# Patient Record
Sex: MALE | Race: WHITE | HISPANIC OR LATINO | URBAN - METROPOLITAN AREA
[De-identification: names, ages, dates, MRNs, and addresses within clinical notes are randomized per-mention and may not be internally consistent; named-entity substitution may affect disease eponyms.]

---

## 2020-04-20 ENCOUNTER — INPATIENT (INPATIENT)
Facility: HOSPITAL | Age: 55
LOS: 2 days | Discharge: HOME | DRG: 388 | End: 2020-04-23
Attending: SPECIALIST | Admitting: SPECIALIST
Payer: MEDICAID

## 2020-04-20 VITALS
SYSTOLIC BLOOD PRESSURE: 156 MMHG | TEMPERATURE: 98 F | WEIGHT: 126.99 LBS | DIASTOLIC BLOOD PRESSURE: 88 MMHG | HEART RATE: 92 BPM | RESPIRATION RATE: 20 BRPM | OXYGEN SATURATION: 95 %

## 2020-04-20 LAB
ALBUMIN SERPL ELPH-MCNC: 3.1 G/DL — LOW (ref 3.5–5)
ALP SERPL-CCNC: 125 U/L — HIGH (ref 40–120)
ALT FLD-CCNC: 178 U/L DA — HIGH (ref 10–60)
ANION GAP SERPL CALC-SCNC: 4 MMOL/L — LOW (ref 5–17)
APPEARANCE UR: CLEAR — SIGNIFICANT CHANGE UP
AST SERPL-CCNC: 102 U/L — HIGH (ref 10–40)
BASOPHILS # BLD AUTO: 0.05 K/UL — SIGNIFICANT CHANGE UP (ref 0–0.2)
BASOPHILS NFR BLD AUTO: 0.6 % — SIGNIFICANT CHANGE UP (ref 0–2)
BILIRUB SERPL-MCNC: 0.9 MG/DL — SIGNIFICANT CHANGE UP (ref 0.2–1.2)
BILIRUB UR-MCNC: NEGATIVE — SIGNIFICANT CHANGE UP
BUN SERPL-MCNC: 8 MG/DL — SIGNIFICANT CHANGE UP (ref 7–18)
CALCIUM SERPL-MCNC: 8.7 MG/DL — SIGNIFICANT CHANGE UP (ref 8.4–10.5)
CHLORIDE SERPL-SCNC: 105 MMOL/L — SIGNIFICANT CHANGE UP (ref 96–108)
CO2 SERPL-SCNC: 30 MMOL/L — SIGNIFICANT CHANGE UP (ref 22–31)
COLOR SPEC: YELLOW — SIGNIFICANT CHANGE UP
CREAT SERPL-MCNC: 0.84 MG/DL — SIGNIFICANT CHANGE UP (ref 0.5–1.3)
DIFF PNL FLD: NEGATIVE — SIGNIFICANT CHANGE UP
EOSINOPHIL # BLD AUTO: 0.1 K/UL — SIGNIFICANT CHANGE UP (ref 0–0.5)
EOSINOPHIL NFR BLD AUTO: 1.2 % — SIGNIFICANT CHANGE UP (ref 0–6)
GLUCOSE SERPL-MCNC: 105 MG/DL — HIGH (ref 70–99)
GLUCOSE UR QL: NEGATIVE — SIGNIFICANT CHANGE UP
HCT VFR BLD CALC: 40.6 % — SIGNIFICANT CHANGE UP (ref 39–50)
HGB BLD-MCNC: 13.7 G/DL — SIGNIFICANT CHANGE UP (ref 13–17)
IMM GRANULOCYTES NFR BLD AUTO: 0.5 % — SIGNIFICANT CHANGE UP (ref 0–1.5)
KETONES UR-MCNC: NEGATIVE — SIGNIFICANT CHANGE UP
LEUKOCYTE ESTERASE UR-ACNC: NEGATIVE — SIGNIFICANT CHANGE UP
LIDOCAIN IGE QN: 90 U/L — SIGNIFICANT CHANGE UP (ref 73–393)
LYMPHOCYTES # BLD AUTO: 2.04 K/UL — SIGNIFICANT CHANGE UP (ref 1–3.3)
LYMPHOCYTES # BLD AUTO: 25.3 % — SIGNIFICANT CHANGE UP (ref 13–44)
MCHC RBC-ENTMCNC: 32.1 PG — SIGNIFICANT CHANGE UP (ref 27–34)
MCHC RBC-ENTMCNC: 33.7 GM/DL — SIGNIFICANT CHANGE UP (ref 32–36)
MCV RBC AUTO: 95.1 FL — SIGNIFICANT CHANGE UP (ref 80–100)
MONOCYTES # BLD AUTO: 0.73 K/UL — SIGNIFICANT CHANGE UP (ref 0–0.9)
MONOCYTES NFR BLD AUTO: 9.1 % — SIGNIFICANT CHANGE UP (ref 2–14)
NEUTROPHILS # BLD AUTO: 5.1 K/UL — SIGNIFICANT CHANGE UP (ref 1.8–7.4)
NEUTROPHILS NFR BLD AUTO: 63.3 % — SIGNIFICANT CHANGE UP (ref 43–77)
NITRITE UR-MCNC: NEGATIVE — SIGNIFICANT CHANGE UP
NRBC # BLD: 0 /100 WBCS — SIGNIFICANT CHANGE UP (ref 0–0)
PH UR: 7 — SIGNIFICANT CHANGE UP (ref 5–8)
PLATELET # BLD AUTO: 202 K/UL — SIGNIFICANT CHANGE UP (ref 150–400)
POTASSIUM SERPL-MCNC: 3.7 MMOL/L — SIGNIFICANT CHANGE UP (ref 3.5–5.3)
POTASSIUM SERPL-SCNC: 3.7 MMOL/L — SIGNIFICANT CHANGE UP (ref 3.5–5.3)
PROT SERPL-MCNC: 7.8 G/DL — SIGNIFICANT CHANGE UP (ref 6–8.3)
PROT UR-MCNC: NEGATIVE — SIGNIFICANT CHANGE UP
RBC # BLD: 4.27 M/UL — SIGNIFICANT CHANGE UP (ref 4.2–5.8)
RBC # FLD: 13.7 % — SIGNIFICANT CHANGE UP (ref 10.3–14.5)
SARS-COV-2 RNA SPEC QL NAA+PROBE: DETECTED
SODIUM SERPL-SCNC: 139 MMOL/L — SIGNIFICANT CHANGE UP (ref 135–145)
SP GR SPEC: 1.01 — SIGNIFICANT CHANGE UP (ref 1.01–1.02)
UROBILINOGEN FLD QL: NEGATIVE — SIGNIFICANT CHANGE UP
WBC # BLD: 8.06 K/UL — SIGNIFICANT CHANGE UP (ref 3.8–10.5)
WBC # FLD AUTO: 8.06 K/UL — SIGNIFICANT CHANGE UP (ref 3.8–10.5)

## 2020-04-20 RX ORDER — IOHEXOL 300 MG/ML
30 INJECTION, SOLUTION INTRAVENOUS ONCE
Refills: 0 | Status: COMPLETED | OUTPATIENT
Start: 2020-04-20 | End: 2020-04-20

## 2020-04-20 RX ORDER — SODIUM CHLORIDE 9 MG/ML
1000 INJECTION INTRAMUSCULAR; INTRAVENOUS; SUBCUTANEOUS ONCE
Refills: 0 | Status: COMPLETED | OUTPATIENT
Start: 2020-04-20 | End: 2020-04-20

## 2020-04-20 RX ORDER — MORPHINE SULFATE 50 MG/1
4 CAPSULE, EXTENDED RELEASE ORAL ONCE
Refills: 0 | Status: DISCONTINUED | OUTPATIENT
Start: 2020-04-20 | End: 2020-04-20

## 2020-04-20 RX ADMIN — MORPHINE SULFATE 4 MILLIGRAM(S): 50 CAPSULE, EXTENDED RELEASE ORAL at 22:24

## 2020-04-20 RX ADMIN — SODIUM CHLORIDE 1000 MILLILITER(S): 9 INJECTION INTRAMUSCULAR; INTRAVENOUS; SUBCUTANEOUS at 22:25

## 2020-04-20 RX ADMIN — IOHEXOL 30 MILLILITER(S): 300 INJECTION, SOLUTION INTRAVENOUS at 22:25

## 2020-04-20 RX ADMIN — SODIUM CHLORIDE 1000 MILLILITER(S): 9 INJECTION INTRAMUSCULAR; INTRAVENOUS; SUBCUTANEOUS at 23:25

## 2020-04-20 NOTE — ED PROVIDER NOTE - CLINICAL SUMMARY MEDICAL DECISION MAKING FREE TEXT BOX
55 yo male presents with abdominal pain. Will obtain labs and urinalysis. Will await for CT results however if unable to obtain, will get CT abdomen.

## 2020-04-20 NOTE — ED PROVIDER NOTE - OBJECTIVE STATEMENT
55 yo male with no PMHx, presents with 3 days of severe abdominal pain. Patient reports having an out-patient CT abdomen with IV contrast today which reportedly showed a tumor. Patient states he was called by a doctor and told he had a tumor - It is unclear whether the tumor is benign or malignant. Patient states he was told to come to ER if he had severe pain so he came to ED tonight. Patient also reports that several years ago he had an episode of hematuria and had a procedure (unsure what procedure), and then his hematuria resolved.     His doctor's phone number is [phone]. Patient had his CT done at [facility] at [address] [place] [address] Rd [phone]. I called the radiology services and left a message to call back. Patient states he was told his official CT report would be available tomorrow.

## 2020-04-20 NOTE — ED PROVIDER NOTE - PROGRESS NOTE DETAILS
endorsed to me. large bowel obstruction. surg consulted. will see pt promptly. -Gurvinder Peoples MD-

## 2020-04-20 NOTE — ED ADULT NURSE NOTE - OBJECTIVE STATEMENT
The patient presents with lower abd pain for three day with tenderness.  He denies n/v/d, dysuria.  He was seen by his PCP and was given unspecified medication with relief; had CAT Scan of abdomen done today.

## 2020-04-21 DIAGNOSIS — K56.609 UNSPECIFIED INTESTINAL OBSTRUCTION, UNSPECIFIED AS TO PARTIAL VERSUS COMPLETE OBSTRUCTION: ICD-10-CM

## 2020-04-21 LAB
APTT BLD: 28.4 SEC — SIGNIFICANT CHANGE UP (ref 27.5–36.3)
BLD GP AB SCN SERPL QL: SIGNIFICANT CHANGE UP
CEA SERPL-MCNC: 3.7 NG/ML — SIGNIFICANT CHANGE UP (ref 0–3.8)
CRP SERPL-MCNC: 2.02 MG/DL — HIGH (ref 0–0.4)
D DIMER BLD IA.RAPID-MCNC: 592 NG/ML DDU — HIGH
FERRITIN SERPL-MCNC: 451 NG/ML — HIGH (ref 30–400)
HCV AB S/CO SERPL IA: 0.16 S/CO — SIGNIFICANT CHANGE UP (ref 0–0.99)
HCV AB SERPL-IMP: SIGNIFICANT CHANGE UP
INR BLD: 1.09 RATIO — SIGNIFICANT CHANGE UP (ref 0.88–1.16)
NT-PROBNP SERPL-SCNC: 35 PG/ML — SIGNIFICANT CHANGE UP (ref 0–125)
PROCALCITONIN SERPL-MCNC: 0.04 NG/ML — SIGNIFICANT CHANGE UP (ref 0.02–0.1)
PROTHROM AB SERPL-ACNC: 12.4 SEC — SIGNIFICANT CHANGE UP (ref 10–12.9)

## 2020-04-21 PROCEDURE — 99285 EMERGENCY DEPT VISIT HI MDM: CPT

## 2020-04-21 PROCEDURE — 74177 CT ABD & PELVIS W/CONTRAST: CPT | Mod: 26

## 2020-04-21 PROCEDURE — 99223 1ST HOSP IP/OBS HIGH 75: CPT

## 2020-04-21 PROCEDURE — 71045 X-RAY EXAM CHEST 1 VIEW: CPT | Mod: 26

## 2020-04-21 PROCEDURE — 99253 IP/OBS CNSLTJ NEW/EST LOW 45: CPT

## 2020-04-21 PROCEDURE — 93010 ELECTROCARDIOGRAM REPORT: CPT

## 2020-04-21 RX ORDER — HEPARIN SODIUM 5000 [USP'U]/ML
5000 INJECTION INTRAVENOUS; SUBCUTANEOUS EVERY 8 HOURS
Refills: 0 | Status: DISCONTINUED | OUTPATIENT
Start: 2020-04-21 | End: 2020-04-23

## 2020-04-21 RX ORDER — SODIUM CHLORIDE 9 MG/ML
1000 INJECTION INTRAMUSCULAR; INTRAVENOUS; SUBCUTANEOUS
Refills: 0 | Status: DISCONTINUED | OUTPATIENT
Start: 2020-04-21 | End: 2020-04-23

## 2020-04-21 RX ORDER — ONDANSETRON 8 MG/1
4 TABLET, FILM COATED ORAL EVERY 6 HOURS
Refills: 0 | Status: DISCONTINUED | OUTPATIENT
Start: 2020-04-21 | End: 2020-04-23

## 2020-04-21 RX ORDER — DEXTROSE MONOHYDRATE, SODIUM CHLORIDE, AND POTASSIUM CHLORIDE 50; .745; 4.5 G/1000ML; G/1000ML; G/1000ML
1000 INJECTION, SOLUTION INTRAVENOUS
Refills: 0 | Status: DISCONTINUED | OUTPATIENT
Start: 2020-04-21 | End: 2020-04-23

## 2020-04-21 RX ORDER — ACETAMINOPHEN 500 MG
1000 TABLET ORAL ONCE
Refills: 0 | Status: COMPLETED | OUTPATIENT
Start: 2020-04-21 | End: 2020-04-21

## 2020-04-21 RX ORDER — SODIUM CHLORIDE 9 MG/ML
1000 INJECTION, SOLUTION INTRAVENOUS
Refills: 0 | Status: DISCONTINUED | OUTPATIENT
Start: 2020-04-21 | End: 2020-04-23

## 2020-04-21 RX ORDER — MORPHINE SULFATE 50 MG/1
4 CAPSULE, EXTENDED RELEASE ORAL EVERY 6 HOURS
Refills: 0 | Status: DISCONTINUED | OUTPATIENT
Start: 2020-04-21 | End: 2020-04-23

## 2020-04-21 RX ADMIN — MORPHINE SULFATE 4 MILLIGRAM(S): 50 CAPSULE, EXTENDED RELEASE ORAL at 05:33

## 2020-04-21 RX ADMIN — HEPARIN SODIUM 5000 UNIT(S): 5000 INJECTION INTRAVENOUS; SUBCUTANEOUS at 23:23

## 2020-04-21 RX ADMIN — Medication 400 MILLIGRAM(S): at 05:19

## 2020-04-21 RX ADMIN — HEPARIN SODIUM 5000 UNIT(S): 5000 INJECTION INTRAVENOUS; SUBCUTANEOUS at 05:33

## 2020-04-21 RX ADMIN — SODIUM CHLORIDE 999 MILLILITER(S): 9 INJECTION, SOLUTION INTRAVENOUS at 05:31

## 2020-04-21 RX ADMIN — SODIUM CHLORIDE 50 MILLILITER(S): 9 INJECTION INTRAMUSCULAR; INTRAVENOUS; SUBCUTANEOUS at 23:23

## 2020-04-21 RX ADMIN — ONDANSETRON 4 MILLIGRAM(S): 8 TABLET, FILM COATED ORAL at 05:33

## 2020-04-21 RX ADMIN — HEPARIN SODIUM 5000 UNIT(S): 5000 INJECTION INTRAVENOUS; SUBCUTANEOUS at 14:35

## 2020-04-21 NOTE — H&P ADULT - ASSESSMENT
53 yo m with no sign.  PMHx, presents with  abdominal pain x 3 days . Pain is described as periumbilical ,non specific and sharp . Denies nausea, and  vomiting. AVSS, PE-non specific periumbilical tenderness. CT-with LBO concerning for colonic neoplasm .Pt is also COVID +.  Admit to surgery     Admit to Surgery under Dr Chang   NPO, IVF on NPO   Medicine consult -Pt is COVID +  Preop, COVID labs  Baseline CXR and EKG  Pain/Nausea control  DVT PPx

## 2020-04-21 NOTE — H&P ADULT - NSHPLABSRESULTS_GEN_ALL_CORE
13.7   8.06  )-----------( 202      ( 20 Apr 2020 21:19 )             40.6   04-20    139  |  105  |  8   ----------------------------<  105<H>  3.7   |  30  |  0.84    Ca    8.7      20 Apr 2020 21:19    TPro  7.8  /  Alb  3.1<L>  /  TBili  0.9  /  DBili  x   /  AST  102<H>  /  ALT  178<H>  /  AlkPhos  125<H>  04-20      < from: CT Abdomen and Pelvis w/ Oral Cont and w/ IV Cont (04.21.20 @ 01:26) >      IMPRESSION:    Partial large bowel obstruction related to sigmoid mass concerning for colonic malignancy. No distant adenopathy. Indeterminate subcentimeter hypodense lesions in the liver. The colon measures up to 5 cm proximal to the transition point. No free air or focal collection.  Nonspecific groundglass and airspace opacities at the lung bases.    < end of copied text >

## 2020-04-21 NOTE — CONSULT NOTE ADULT - ATTENDING COMMENTS
Patient seen and examined ; case was discussed with the admitting resident    ROS: as in the HPI; all other ROS negative    SH and family history as above    Vital Signs Last 24 Hrs  T(C): 37.2 (2020 05:25), Max: 37.2 (2020 05:25)  T(F): 98.9 (2020 05:25), Max: 98.9 (2020 05:25)  HR: 77 (2020 05:25) (77 - 92)  BP: 120/73 (2020 05:25) (120/73 - 156/88)  BP(mean): --  RR: 18 (2020 05:25) (18 - 20)  SpO2: 95% (2020 05:25) (95% - 96%)    GEN: NAD  HEENT- normocephalic; mouth moist  CVS- S1S2+  LUNGS- clear to auscultation; no wheezing  ABD: Soft , nontender, nondistended, Bowel sounds are present  EXTREMITY: no calf tenderness, no cyanosis, no edema  NEURO: AAOx3; non focal neurologic exam; cranial nerves grossly intact  PSYCH: normal affect and behavior  BACK: no swelling or mass;   VASCULAR: ++ distal peripheral pulses  SKIN: warm and dry.       Labs Reviewed:                         13.7   8.06  )-----------( 202      ( 2020 21:19 )             40.6     04-20    139  |  105  |  8   ----------------------------<  105<H>  3.7   |  30  |  0.84    Ca    8.7      2020 21:19    TPro  7.8  /  Alb  3.1<L>  /  TBili  0.9  /  DBili  x   /  AST  102<H>  /  ALT  178<H>  /  AlkPhos  125<H>  04-20        Urinalysis Basic - ( 2020 21:19 )    Color: Yellow / Appearance: Clear / S.010 / pH: x  Gluc: x / Ketone: Negative  / Bili: Negative / Urobili: Negative   Blood: x / Protein: Negative / Nitrite: Negative   Leuk Esterase: Negative / RBC: x / WBC x   Sq Epi: x / Non Sq Epi: x / Bacteria: x      PT/INR - ( 2020 05:15 )   PT: 12.4 sec;   INR: 1.09 ratio         PTT - ( 2020 05:15 )  PTT:28.4 sec  BNP: Serum Pro-Brain Natriuretic Peptide: 35 pg/mL ( @ 05:15)    MEDICATIONS  (STANDING):  dextrose 5% + sodium chloride 0.45% with potassium chloride 20 mEq/L 1000 milliLiter(s) (100 mL/Hr) IV Continuous <Continuous>  heparin   Injectable 5000 Unit(s) SubCutaneous every 8 hours  lactated ringers. 1000 milliLiter(s) (999 mL/Hr) IV Continuous <Continuous>    MEDICATIONS  (PRN):  morphine  - Injectable 4 milliGRAM(s) IV Push every 6 hours PRN Severe Pain (7 - 10)  ondansetron Injectable 4 milliGRAM(s) IV Push every 6 hours PRN Nausea    CT abd/pelv reviewed   EKG Reviewed      55 y/o M with large bowel obstruction likely due to colon cancer. Patient has had intermittent BRBPR and associated weight loss. Denies any respiratory symptoms, had malaise and cough about a month ago but none now.    1. Large bowel obstruction concerning for colon ca- npo, serial abdominal exams, IVF, management per surgery, surgical oncology. Further imaging for staging per their recommendations. RCRI is 0 and patient with good METS>4, low risk for potential surgery with non-modifiable risk factors with no further testing warranted.   2. COVID19 positive- denies any symptoms, is stable on RA, CXR pending but will defer specific therapies in absence of these findings. F/u CXR, d dimer, ferritin, pct.   3. Elevated hepatic enzymes- indeterminate lesion seen on CT abd, suspected viral sequelae vs possible metastatic focus   Plan of care discussed with patient ;  all questions and concerns were addressed.

## 2020-04-21 NOTE — CONSULT NOTE ADULT - ASSESSMENT
Patient is a 54y old  Male with no prior medical problems who presents with a chief complaint of abdominal pain. Patient came in to ER for abdominal pain x 3 days associated with nausea and vomiting. He is found to have partial large bowel obstruction due to sigmoid colon mass and is covid positive. He is afebrile and is saturating well on room air. Medical team is consulted for managed for covid infection.     Large bowel obstruction   Suspected malignancy   COVID-19 infection  Transaminitis     Plan:     COVID-19 infection  recommend to obtain chest x ray   Monitor Oxygen saturations , currently saturating well on room air  check D-Dimers, Ferritin, CRP   Check EKG for QTC prolongation  Since patient will be NPO, can consider tocilizumab if inflammatory markers are elevated i-e ferritin >700 and crp >30.  Judicious IV fluids as needed to avoid volume overload in COVID infection.   replace electrolytes as needed   Large bowl obstruction managed as per surgery Patient is a 54y old  Male with no prior medical problems who presents with a chief complaint of abdominal pain. Patient came in to ER for abdominal pain x 3 days associated with nausea and vomiting. He is found to have partial large bowel obstruction due to sigmoid colon mass and is covid positive. He is afebrile and is saturating well on room air. Medical team is consulted for managed for covid infection.     Large bowel obstruction   Suspected malignancy   COVID-19 infection  Transaminitis     Plan:     COVID-19 infection  recommend to obtain chest x ray   Monitor Oxygen saturations , currently saturating well on room air  check D-Dimers, Ferritin, CRP   Check EKG NSR with normal QTC  Since patient will be NPO, can consider tocilizumab if inflammatory markers are elevated i-e ferritin >700 and crp >30, currently would hold off to any COVID-19 regime due to patient being asymptomatic.  Judicious IV fluids as needed to avoid volume overload in COVID infection.   replace electrolytes as needed  Will recommend CT head and chest for staging  WIll recommend medical oncology consult   RCRI score 0. Patient is medically optimized.   Large bowl obstruction managed as per surgery

## 2020-04-21 NOTE — CONSULT NOTE ADULT - ASSESSMENT
54 year old male with sigmoid mass.  Covid positive     Sigmoid Mass   Need colonoscopy   Will discuss with administration when possible to perform procedure     COVID as per primary team   Advanced care planning was discussed with patient and family.  Advanced care planning forms were reviewed and discussed.  Risks, benefits and alternatives of gastroenterologic procedures were discussed in detail and all questions were answered.

## 2020-04-21 NOTE — CONSULT NOTE ADULT - SUBJECTIVE AND OBJECTIVE BOX
Patient is a 54y old  Male who presents with a chief complaint of Abdominal Pain-LBO (21 Apr 2020 11:38)    53 yo m with no sign.  PMHx, presents with  abdominal pain x 3 days . Pain is described as periumbilical ,non specific and sharp . Denies nausea, and  vomiting. Also denies fever, chills, SOB, cough, sick contacts , recent travels , chest pain or any other problems . Last BM was yesterday AM .No diarrhea or constipation Patient reports having an out-patient CT abdomen with IV contrast today which reportedly showed a tumor. Patient states he was told to come to ER if he had severe pain so he came to ED tonight. Patient also reports that several years ago he had an episode of hematuria and had a procedure (unsure what procedure), and then his hematuria resolved. His doctor's phone number is [phone]. No other complaints       REVIEW OF SYSTEMS  Constitutional:   No fever, no fatigue, no pallor, no night sweats, no weight loss.  HEENT:   No eye pain, no vision changes, no icterus, no mouth ulcers.  Respiratory:   No shortness of breath, no cough, no respiratory distress.   Cardiovascular:   No chest pain, no palpitations.   Gastrointestinal: No abdominal pain, no nausea, no vomiting , no diahrrea, no constipation, no hematochezia,no melena.  Skin:   No rashes, no jaundice, no eczema.   Musculoskeletal:   No joint pain, no swelling, no myalgia.   Neurologic:   No headache, no seizure, no weakness.   Genitourinary:   No dysuria, no decreased urine output.  Psychiatric:  No depression, no anxiety,   Endocrine:   No thyroid disease, no diabetes.  Heme/Lymphatic:   No anemia, no blood transfusions, no lymph node enlargement, no bleeding, no bruising.  ___________________________________________________________________________________________  Allergies    No Known Allergies    Intolerances      MEDICATIONS  (STANDING):  dextrose 5% + sodium chloride 0.45% with potassium chloride 20 mEq/L 1000 milliLiter(s) (100 mL/Hr) IV Continuous <Continuous>  heparin   Injectable 5000 Unit(s) SubCutaneous every 8 hours  lactated ringers. 1000 milliLiter(s) (999 mL/Hr) IV Continuous <Continuous>    MEDICATIONS  (PRN):  morphine  - Injectable 4 milliGRAM(s) IV Push every 6 hours PRN Severe Pain (7 - 10)  ondansetron Injectable 4 milliGRAM(s) IV Push every 6 hours PRN Nausea      PAST MEDICAL & SURGICAL HISTORY:    FAMILY HISTORY:    Social History: No hsitory of : Tobacco use, IVDA, EToH  ______________________________________________________________________________________    PHYSICAL EXAM    Daily     Daily   BMI: 140.6 (04-21 @ 04:52)  Change in Weight:  Vital Signs Last 24 Hrs  T(C): 36.3 (21 Apr 2020 10:26), Max: 37.2 (21 Apr 2020 05:25)  T(F): 97.3 (21 Apr 2020 10:26), Max: 98.9 (21 Apr 2020 05:25)  HR: 68 (21 Apr 2020 10:26) (62 - 92)  BP: 112/74 (21 Apr 2020 10:26) (112/74 - 156/88)  BP(mean): --  RR: 16 (21 Apr 2020 10:26) (16 - 20)  SpO2: 97% (21 Apr 2020 10:26) (95% - 98%)    General:  Well developed, well nourished, alert and active, no pallor, NAD.  HEENT:    Normal appearance of conjunctiva, ears, nose, lips, oropharynx, and oral mucosa, anicteric.  Neck:  No masses, no asymmetry.  Lymph Nodes:  No lymphadenopathy.   Cardiovascular:  RRR normal S1/S2, no murmur.  Respiratory:  CTA B/L, normal respiratory effort.   Abdominal:   soft, no masses or tenderness, normoactive BS, NT/ND, no HSM.  Extremities:   No clubbing or cyanosis, normal capillary refill, no edema.   Skin:   No rash, jaundice, lesions, eczema.   Musculoskeletal:  No joint swelling, erythema or tenderness.   Neuro: No focal deficits.   Other:   _______________________________________________________________________________________________  Lab Results:                          13.7   8.06  )-----------( 202      ( 20 Apr 2020 21:19 )             40.6     04-20    139  |  105  |  8   ----------------------------<  105<H>  3.7   |  30  |  0.84    Ca    8.7      20 Apr 2020 21:19    TPro  7.8  /  Alb  3.1<L>  /  TBili  0.9  /  DBili  x   /  AST  102<H>  /  ALT  178<H>  /  AlkPhos  125<H>  04-20    LIVER FUNCTIONS - ( 20 Apr 2020 21:19 )  Alb: 3.1 g/dL / Pro: 7.8 g/dL / ALK PHOS: 125 U/L / ALT: 178 U/L DA / AST: 102 U/L / GGT: x           PT/INR - ( 21 Apr 2020 05:15 )   PT: 12.4 sec;   INR: 1.09 ratio         PTT - ( 21 Apr 2020 05:15 )  PTT:28.4 sec  C-Reactive Protein, Serum: 2.02 mg/dL (04-21 @ 09:25)        Stool Results:          RADIOLOGY RESULTS:  < from: CT Abdomen and Pelvis w/ Oral Cont and w/ IV Cont (04.21.20 @ 01:26) >    EXAM:  CT ABDOMEN AND PELVIS OC IC                            PROCEDURE DATE:  04/21/2020          INTERPRETATION:  Abdominal/Pelvic CT    4/21/2020 1:51 AM    Indication: Diffuse abdominal pain, worst in the left lower quadrant    Technique: Axialimages were obtained following oral and IV contrast from the lung bases through pubic symphysis.  95 cc of Omnipaque 350 was administered intravenously without complication and 5 cc was discarded.  Reformatted coronal and sagittal images are submitted.    Comparison: None    FINDINGS:    LUNG BASES:  There are patchy groundglass and airspace opacities at the lung bases which are nonspecific.  PERITONEUM:  There is no free air or focal collection.  No free fluid.  LIVER: 8 mm hypodense lesion in the right lobe is indeterminate.  SPLEEN: Normal.  GALLBLADDER: Vicarious excretion of contrast.  BILIARY TREE: Unremarkable.  PANCREAS: Normal.  ADRENAL GLANDS: Normal.  KIDNEYS: Normal.  BOWEL: The stomach is incompletely distended.  Oral contrast isseen as distally as splenic flexure.  There is no small bowel obstruction. The appendix is unremarkable. Marked dilatation and wall thickening of the sigmoid colon with circumferential mass in the mid sigmoid colon concerning for colonic neoplasm.Thececum measures up to 4.6 cm in caliber. The left colon measures up to 4.8 cm. The sigmoid colon measures up to 5 cm proximal to the transition point.  There are no large pericolonic lymph nodes.    URINARY BLADDER: Incompletely distended.  PELVIC ORGANS: The prostate gland is not enlarged.    There is no significant adenopathy.  VASCULATURE: Unremarkable.  RETROPERITONEUM:  There is no mass.  BONES: Unremarkable.  ABDOMINAL WALL: Unremarkable.    IMPRESSION:    Partial large bowel obstruction related to sigmoid mass concerning for colonic malignancy. No distant adenopathy. Indeterminate subcentimeter hypodense lesions in the liver. The colon measures up to 5 cm proximal to the transition point. No free air or focal collection.  Nonspecific groundglass and airspace opacities at the lung bases.                JUVENCIO IVEY M.D ATTENDING RADIOLOGIST  This document has been electronically signed. Apr 21 2020  2:03AM                < end of copied text >  < from: CT Abdomen and Pelvis w/ Oral Cont and w/ IV Cont (04.21.20 @ 01:26) >    EXAM:  CT ABDOMEN AND PELVIS OC IC                            PROCEDURE DATE:  04/21/2020          INTERPRETATION:  Abdominal/Pelvic CT    4/21/2020 1:51 AM    Indication: Diffuse abdominal pain, worst in the left lower quadrant    Technique: Axialimages were obtained following oral and IV contrast from the lung bases through pubic symphysis.  95 cc of Omnipaque 350 was administered intravenously without complication and 5 cc was discarded.  Reformatted coronal and sagittal images are submitted.    Comparison: None    FINDINGS:    LUNG BASES:  There are patchy groundglass and airspace opacities at the lung bases which are nonspecific.  PERITONEUM:  There is no free air or focal collection.  No free fluid.  LIVER: 8 mm hypodense lesion in the right lobe is indeterminate.  SPLEEN: Normal.  GALLBLADDER: Vicarious excretion of contrast.  BILIARY TREE: Unremarkable.  PANCREAS: Normal.  ADRENAL GLANDS: Normal.  KIDNEYS: Normal.  BOWEL: The stomach is incompletely distended.  Oral contrast isseen as distally as splenic flexure.  There is no small bowel obstruction. The appendix is unremarkable. Marked dilatation and wall thickening of the sigmoid colon with circumferential mass in the mid sigmoid colon concerning for colonic neoplasm.Thececum measures up to 4.6 cm in caliber. The left colon measures up to 4.8 cm. The sigmoid colon measures up to 5 cm proximal to the transition point.  There are no large pericolonic lymph nodes.    URINARY BLADDER: Incompletely distended.  PELVIC ORGANS: The prostate gland is not enlarged.    There is no significant adenopathy.  VASCULATURE: Unremarkable.  RETROPERITONEUM:  There is no mass.  BONES: Unremarkable.  ABDOMINAL WALL: Unremarkable.    IMPRESSION:    Partial large bowel obstruction related to sigmoid mass concerning for colonic malignancy. No distant adenopathy. Indeterminate subcentimeter hypodense lesions in the liver. The colon measures up to 5 cm proximal to the transition point. No free air or focal collection.  Nonspecific groundglass and airspace opacities at the lung bases.                JUVENCIO IVEY M.D, ATTENDING RADIOLOGIST  This document has been electronically signed. Apr 21 2020  2:03AM                < end of copied text >    SURGICAL PATHOLOGY:

## 2020-04-21 NOTE — PROGRESS NOTE ADULT - SUBJECTIVE AND OBJECTIVE BOX
54y Male    Surgery Progress Note:    S:     Patient resting comfortably. No acute complaints. passed flatus        Gen: A&Ox3. NAD  Abd:  Soft, nondistended,     VS:    T(C): 36.3 (04-21-20 @ 10:26), Max: 37.2 (04-21-20 @ 05:25)  HR: 68 (04-21-20 @ 10:26) (62 - 92)  BP: 112/74 (04-21-20 @ 10:26) (112/74 - 156/88)  RR: 16 (04-21-20 @ 10:26) (16 - 20)  SpO2: 97% (04-21-20 @ 10:26) (95% - 98%)  Wt(kg): --    I/O's     Meds: dextrose 5% + sodium chloride 0.45% with potassium chloride 20 mEq/L 1000 milliLiter(s) IV Continuous <Continuous>  heparin   Injectable 5000 Unit(s) SubCutaneous every 8 hours  lactated ringers. 1000 milliLiter(s) IV Continuous <Continuous>  morphine  - Injectable 4 milliGRAM(s) IV Push every 6 hours PRN  ondansetron Injectable 4 milliGRAM(s) IV Push every 6 hours PRN      Labs:                             13.7   8.06  )-----------( 202      ( 20 Apr 2020 21:19 )             40.6     04-20    139  |  105  |  8   ----------------------------<  105<H>  3.7   |  30  |  0.84    Ca    8.7      20 Apr 2020 21:19    TPro  7.8  /  Alb  3.1<L>  /  TBili  0.9  /  DBili  x   /  AST  102<H>  /  ALT  178<H>  /  AlkPhos  125<H>  04-20    LIVER FUNCTIONS - ( 20 Apr 2020 21:19 )  Alb: 3.1 g/dL / Pro: 7.8 g/dL / ALK PHOS: 125 U/L / ALT: 178 U/L DA / AST: 102 U/L / GGT: x

## 2020-04-21 NOTE — H&P ADULT - NSHPPHYSICALEXAM_GEN_ALL_CORE
Vital Signs Last 24 Hrs  T(C): 37.2 (21 Apr 2020 05:25), Max: 37.2 (21 Apr 2020 05:25)  T(F): 98.9 (21 Apr 2020 05:25), Max: 98.9 (21 Apr 2020 05:25)  HR: 77 (21 Apr 2020 05:25) (77 - 92)  BP: 120/73 (21 Apr 2020 05:25) (120/73 - 156/88)  BP(mean): --  RR: 18 (21 Apr 2020 05:25) (18 - 20)  SpO2: 95% (21 Apr 2020 05:25) (95% - 96%)    General:  A&Ox3,Appears stated age, No acute distress,  Head: NC/AT  EENT: PERRLA. EOMI. Conjunctiva and sclera clear. Pharynx clear.  Neck: Supple. No JVD  Lungs: CTA B/l. Nonlabored Respirations  CV: +S1S2, RRR  Abdomen: Soft, Nondistended,  mild tenderness in the periumbilical area, no guarding, no rebound  Extremities: Warm and well perfused. 2+ peripheral pulses b/l. Calf soft, nontender b/l. No pedal edema.

## 2020-04-21 NOTE — CONSULT NOTE ADULT - SUBJECTIVE AND OBJECTIVE BOX
Patient is a 54y old  Male with no prior medical problems who presents with a chief complaint of abdominal pain. Patient came in to ER for abdominal pain x 3 days associated with nausea and vomiting. He is found to have partial large bowel obstruction due to sigmoid colon mass and is covid positive. He is afebrile and is saturating well on room air. Medical team is consulted for managed for covid infection. All other ROS are negative.      REVIEW OF SYSTEMS:  CONSTITUTIONAL: No fever, weight loss, or fatigue  EYES: No eye pain, visual disturbances, or discharge  ENMT:  No difficulty hearing, tinnitus, vertigo; No sinus or throat pain  NECK: No pain or stiffness  BREASTS: No pain, masses, or nipple discharge  RESPIRATORY: No cough, wheezing, chills or hemoptysis; No shortness of breath  CARDIOVASCULAR: No chest pain, palpitations, dizziness, or leg swelling  GASTROINTESTINAL: abdominal pain +  GENITOURINARY: No dysuria, frequency, hematuria, or incontinence  NEUROLOGICAL: No headaches, memory loss, loss of strength, numbness, or tremors  SKIN: No itching, burning, rashes, or lesions   LYMPH NODES: No enlarged glands  ENDOCRINE: No heat or cold intolerance; No hair loss  MUSCULOSKELETAL: No joint pain or swelling; No muscle, back, or extremity pain  PSYCHIATRIC: No depression, anxiety, mood swings, or difficulty sleeping  HEME/LYMPH: No easy bruising, or bleeding gums  ALLERY AND IMMUNOLOGIC: No hives or eczema      INTERVAL HPI/OVERNIGHT EVENTS:  T(C): 36.4 (20 @ 01:38), Max: 36.5 (20 @ 20:19)  HR: 88 (20 @ :38) (88 - 92)  BP: 125/77 (20 @ 01:38) (125/77 - 156/88)  RR: 18 (20 @ :38) (18 - 20)  SpO2: 96% (20 @ 01:38) (95% - 96%)  Wt(kg): --  I&O's Summary      PHYSICAL EXAM:  GENERAL: NAD, well-groomed, well-developed  HEAD:  Atraumatic, Normocephalic  EYES: EOMI, PERRLA, conjunctiva and sclera clear  ENMT: No tonsillar erythema, exudates, or enlargement; Moist mucous membranes, Good dentition, No lesions  NECK: Supple, No JVD, Normal thyroid  NERVOUS SYSTEM:  Alert & Oriented X3, Good concentration; Motor Strength 5/5 B/L upper and lower extremities; DTRs 2+ intact and symmetric  CHEST/LUNG: Clear to percussion bilaterally; No rales, rhonchi, wheezing, or rubs  HEART: Regular rate and rhythm; No murmurs, rubs, or gallops  ABDOMEN: LLQ abdominal tenderness   EXTREMITIES:  2+ Peripheral Pulses, No clubbing, cyanosis, or edema  LYMPH: No lymphadenopathy noted  SKIN: No rashes or lesions        LABS:                        13.7   8.06  )-----------( 202      ( 2020 21:19 )             40.6         139  |  105  |  8   ----------------------------<  105<H>  3.7   |  30  |  0.84    Ca    8.7      2020 21:19    TPro  7.8  /  Alb  3.1<L>  /  TBili  0.9  /  DBili  x   /  AST  102<H>  /  ALT  178<H>  /  AlkPhos  125<H>        Urinalysis Basic - ( 2020 21:19 )    Color: Yellow / Appearance: Clear / S.010 / pH: x  Gluc: x / Ketone: Negative  / Bili: Negative / Urobili: Negative   Blood: x / Protein: Negative / Nitrite: Negative   Leuk Esterase: Negative / RBC: x / WBC x   Sq Epi: x / Non Sq Epi: x / Bacteria: x      CAPILLARY BLOOD GLUCOSE            Urinalysis Basic - ( 2020 21:19 )    Color: Yellow / Appearance: Clear / S.010 / pH: x  Gluc: x / Ketone: Negative  / Bili: Negative / Urobili: Negative   Blood: x / Protein: Negative / Nitrite: Negative   Leuk Esterase: Negative / RBC: x / WBC x   Sq Epi: x / Non Sq Epi: x / Bacteria: x          RADIOLOGY & ADDITIONAL TESTS:  < from: CT Abdomen and Pelvis w/ Oral Cont and w/ IV Cont (20 @ 01:26) >  LUNG BASES:  There are patchy groundglass and airspace opacities at the lung bases which are nonspecific.  PERITONEUM:  There is no free air or focal collection.  No free fluid.  LIVER: 8 mm hypodense lesion in the right lobe is indeterminate.  SPLEEN: Normal.  GALLBLADDER: Vicarious excretion of contrast.  BILIARY TREE: Unremarkable.  PANCREAS: Normal.  ADRENAL GLANDS: Normal.  KIDNEYS: Normal.  BOWEL: The stomach is incompletely distended.  Oral contrast isseen as distally as splenic flexure.  There is no small bowel obstruction. The appendix is unremarkable. Marked dilatation and wall thickening of the sigmoid colon with circumferential mass in the mid sigmoid colon concerning for colonic neoplasm.Thececum measures up to 4.6 cm in caliber. The left colon measures up to 4.8 cm. The sigmoid colon measures up to 5 cm proximal to the transition point.  There are no large pericolonic lymph nodes.    URINARY BLADDER: Incompletely distended.  PELVIC ORGANS: The prostate gland is not enlarged.    There is no significant adenopathy.  VASCULATURE: Unremarkable.  RETROPERITONEUM:  There is no mass.  BONES: Unremarkable.  ABDOMINAL WALL: Unremarkable.          Imaging Personally Reviewed:  [x ] YES  [ ] NO    Consultant(s) Notes Reviewed:  [x ] YES  [ ] NO    Care Discussed with Consultants/Other Providers [ ] YES  [x ] NO Patient is a 54y old  Male with no prior medical problems who presents with a chief complaint of abdominal pain. Patient came in to ER for abdominal pain x 3 days. Patient had an outpatient ct scan which showed a tumor. He did not had any evaluation performed for that. Today his abdominal pain became worse and he came in to ER. He is found to have partial large bowel obstruction due to sigmoid colon mass and is covid positive. He is afebrile and is saturating well on room air. He had mild flu like symptoms a month ago but denied any fevers, chills, nausea, sick contacts. Medical team is consulted for managed for covid infection. All other ROS are negative.      REVIEW OF SYSTEMS:  CONSTITUTIONAL: No fever, weight loss, or fatigue  EYES: No eye pain, visual disturbances, or discharge  ENMT:  No difficulty hearing, tinnitus, vertigo; No sinus or throat pain  NECK: No pain or stiffness  BREASTS: No pain, masses, or nipple discharge  RESPIRATORY: No cough, wheezing, chills or hemoptysis; No shortness of breath  CARDIOVASCULAR: No chest pain, palpitations, dizziness, or leg swelling  GASTROINTESTINAL: abdominal pain +  GENITOURINARY: No dysuria, frequency, hematuria, or incontinence  NEUROLOGICAL: No headaches, memory loss, loss of strength, numbness, or tremors  SKIN: No itching, burning, rashes, or lesions   LYMPH NODES: No enlarged glands  ENDOCRINE: No heat or cold intolerance; No hair loss  MUSCULOSKELETAL: No joint pain or swelling; No muscle, back, or extremity pain  PSYCHIATRIC: No depression, anxiety, mood swings, or difficulty sleeping  HEME/LYMPH: No easy bruising, or bleeding gums  ALLERY AND IMMUNOLOGIC: No hives or eczema      T(C): 36.4 (20 @ 01:38), Max: 36.5 (20 @ 20:19)  HR: 88 (20 @ :38) (88 - 92)  BP: 125/77 (20 @ 01:38) (125/77 - 156/88)  RR: 18 (20 @ :38) (18 - 20)  SpO2: 96% (20 @ 01:38) (95% - 96%)  Wt(kg): --  I&O's Summary      PHYSICAL EXAM:  GENERAL: NAD, well-groomed, well-developed  HEAD:  Atraumatic, Normocephalic  EYES: EOMI, PERRLA, conjunctiva and sclera clear  ENMT: No tonsillar erythema, exudates, or enlargement; Moist mucous membranes, Good dentition, No lesions  NECK: Supple, No JVD, Normal thyroid  NERVOUS SYSTEM:  Alert & Oriented X3, Good concentration; Motor Strength 5/5 B/L upper and lower extremities; DTRs 2+ intact and symmetric  CHEST/LUNG: Clear to percussion bilaterally; No rales, rhonchi, wheezing, or rubs  HEART: Regular rate and rhythm; No murmurs, rubs, or gallops  ABDOMEN: tense abdomen, no rebound tenderness, However tenderness is more pronounced LLQ abdominal   EXTREMITIES:  2+ Peripheral Pulses, No clubbing, cyanosis, or edema  LYMPH: No lymphadenopathy noted  SKIN: No rashes or lesions        LABS:                        13.7   8.06  )-----------(       ( 2020 21:19 )             40.6     -    139  |  105  |  8   ----------------------------<  105<H>  3.7   |  30  |  0.84    Ca    8.7      2020 21:19    TPro  7.8  /  Alb  3.1<L>  /  TBili  0.9  /  DBili  x   /  AST  102<H>  /  ALT  178<H>  /  AlkPhos  125<H>  -20      Urinalysis Basic - ( 2020 21:19 )    Color: Yellow / Appearance: Clear / S.010 / pH: x  Gluc: x / Ketone: Negative  / Bili: Negative / Urobili: Negative   Blood: x / Protein: Negative / Nitrite: Negative   Leuk Esterase: Negative / RBC: x / WBC x   Sq Epi: x / Non Sq Epi: x / Bacteria: x      CAPILLARY BLOOD GLUCOSE            Urinalysis Basic - ( 2020 21:19 )    Color: Yellow / Appearance: Clear / S.010 / pH: x  Gluc: x / Ketone: Negative  / Bili: Negative / Urobili: Negative   Blood: x / Protein: Negative / Nitrite: Negative   Leuk Esterase: Negative / RBC: x / WBC x   Sq Epi: x / Non Sq Epi: x / Bacteria: x          RADIOLOGY & ADDITIONAL TESTS:  < from: CT Abdomen and Pelvis w/ Oral Cont and w/ IV Cont (20 @ 01:26) >  LUNG BASES:  There are patchy groundglass and airspace opacities at the lung bases which are nonspecific.  PERITONEUM:  There is no free air or focal collection.  No free fluid.  LIVER: 8 mm hypodense lesion in the right lobe is indeterminate.  SPLEEN: Normal.  GALLBLADDER: Vicarious excretion of contrast.  BILIARY TREE: Unremarkable.  PANCREAS: Normal.  ADRENAL GLANDS: Normal.  KIDNEYS: Normal.  BOWEL: The stomach is incompletely distended.  Oral contrast isseen as distally as splenic flexure.  There is no small bowel obstruction. The appendix is unremarkable. Marked dilatation and wall thickening of the sigmoid colon with circumferential mass in the mid sigmoid colon concerning for colonic neoplasm.Thececum measures up to 4.6 cm in caliber. The left colon measures up to 4.8 cm. The sigmoid colon measures up to 5 cm proximal to the transition point.  There are no large pericolonic lymph nodes.    URINARY BLADDER: Incompletely distended.  PELVIC ORGANS: The prostate gland is not enlarged.    There is no significant adenopathy.  VASCULATURE: Unremarkable.  RETROPERITONEUM:  There is no mass.  BONES: Unremarkable.  ABDOMINAL WALL: Unremarkable.          Imaging Personally Reviewed:  [x ] YES  [ ] NO    Consultant(s) Notes Reviewed:  [x ] YES  [ ] NO    Care Discussed with Consultants/Other Providers [ ] YES  [x ] NO

## 2020-04-21 NOTE — CONSULT NOTE ADULT - CONSULT REQUESTED BY NAME
Dr Chang
Surgery
This is a 69 yo man with history of HTN with complaints of intermittent chest heaviness, increasing fatigue and shortness of breath. Went for stress test today which was abnormal pt had chest heaviness on treadmill with diffuse ST depression, inferior hypokinesia. cath revealed normal LV function with heavily calcified coronaries with severe diseae of proxix aand mid LAD with aneurysmal dilitation of proximal LAD and severe disease of distal RCA 99% probable culprit.

## 2020-04-21 NOTE — H&P ADULT - HISTORY OF PRESENT ILLNESS
53 yo m with no sign.  PMHx, presents with  abdominal pain x 3 days . Pain is described as periumbilical ,non specific and sharp . Denies nausea, and  vomiting. Also denies fever, chills, SOB, cough, sick contacts , recent travels , chest pain or any other problems . Last BM was yesterday AM .No diarrhea or constipation Patient reports having an out-patient CT abdomen with IV contrast today which reportedly showed a tumor. Patient states he was told to come to ER if he had severe pain so he came to ED tonight. Patient also reports that several years ago he had an episode of hematuria and had a procedure (unsure what procedure), and then his hematuria resolved. His doctor's phone number is [phone]. No other complaints

## 2020-04-22 DIAGNOSIS — K56.609 UNSPECIFIED INTESTINAL OBSTRUCTION, UNSPECIFIED AS TO PARTIAL VERSUS COMPLETE OBSTRUCTION: ICD-10-CM

## 2020-04-22 DIAGNOSIS — Z29.9 ENCOUNTER FOR PROPHYLACTIC MEASURES, UNSPECIFIED: ICD-10-CM

## 2020-04-22 DIAGNOSIS — U07.1 COVID-19: ICD-10-CM

## 2020-04-22 DIAGNOSIS — Z02.9 ENCOUNTER FOR ADMINISTRATIVE EXAMINATIONS, UNSPECIFIED: ICD-10-CM

## 2020-04-22 LAB
ALBUMIN SERPL ELPH-MCNC: 3.1 G/DL — LOW (ref 3.5–5)
ALP SERPL-CCNC: 115 U/L — SIGNIFICANT CHANGE UP (ref 40–120)
ALT FLD-CCNC: 163 U/L DA — HIGH (ref 10–60)
ANION GAP SERPL CALC-SCNC: 6 MMOL/L — SIGNIFICANT CHANGE UP (ref 5–17)
AST SERPL-CCNC: 69 U/L — HIGH (ref 10–40)
BASOPHILS # BLD AUTO: 0.06 K/UL — SIGNIFICANT CHANGE UP (ref 0–0.2)
BASOPHILS NFR BLD AUTO: 1 % — SIGNIFICANT CHANGE UP (ref 0–2)
BILIRUB SERPL-MCNC: 1.1 MG/DL — SIGNIFICANT CHANGE UP (ref 0.2–1.2)
BUN SERPL-MCNC: 9 MG/DL — SIGNIFICANT CHANGE UP (ref 7–18)
CALCIUM SERPL-MCNC: 8.6 MG/DL — SIGNIFICANT CHANGE UP (ref 8.4–10.5)
CHLORIDE SERPL-SCNC: 106 MMOL/L — SIGNIFICANT CHANGE UP (ref 96–108)
CO2 SERPL-SCNC: 27 MMOL/L — SIGNIFICANT CHANGE UP (ref 22–31)
CREAT SERPL-MCNC: 0.94 MG/DL — SIGNIFICANT CHANGE UP (ref 0.5–1.3)
CULTURE RESULTS: SIGNIFICANT CHANGE UP
EOSINOPHIL # BLD AUTO: 0.45 K/UL — SIGNIFICANT CHANGE UP (ref 0–0.5)
EOSINOPHIL NFR BLD AUTO: 7.2 % — HIGH (ref 0–6)
FERRITIN SERPL-MCNC: 527 NG/ML — HIGH (ref 30–400)
GLUCOSE SERPL-MCNC: 134 MG/DL — HIGH (ref 70–99)
HCT VFR BLD CALC: 42.2 % — SIGNIFICANT CHANGE UP (ref 39–50)
HGB BLD-MCNC: 14.1 G/DL — SIGNIFICANT CHANGE UP (ref 13–17)
IMM GRANULOCYTES NFR BLD AUTO: 0.3 % — SIGNIFICANT CHANGE UP (ref 0–1.5)
LYMPHOCYTES # BLD AUTO: 1.77 K/UL — SIGNIFICANT CHANGE UP (ref 1–3.3)
LYMPHOCYTES # BLD AUTO: 28.4 % — SIGNIFICANT CHANGE UP (ref 13–44)
MCHC RBC-ENTMCNC: 32.1 PG — SIGNIFICANT CHANGE UP (ref 27–34)
MCHC RBC-ENTMCNC: 33.4 GM/DL — SIGNIFICANT CHANGE UP (ref 32–36)
MCV RBC AUTO: 96.1 FL — SIGNIFICANT CHANGE UP (ref 80–100)
MONOCYTES # BLD AUTO: 0.6 K/UL — SIGNIFICANT CHANGE UP (ref 0–0.9)
MONOCYTES NFR BLD AUTO: 9.6 % — SIGNIFICANT CHANGE UP (ref 2–14)
NEUTROPHILS # BLD AUTO: 3.33 K/UL — SIGNIFICANT CHANGE UP (ref 1.8–7.4)
NEUTROPHILS NFR BLD AUTO: 53.5 % — SIGNIFICANT CHANGE UP (ref 43–77)
NRBC # BLD: 0 /100 WBCS — SIGNIFICANT CHANGE UP (ref 0–0)
PLATELET # BLD AUTO: 175 K/UL — SIGNIFICANT CHANGE UP (ref 150–400)
POTASSIUM SERPL-MCNC: 3.9 MMOL/L — SIGNIFICANT CHANGE UP (ref 3.5–5.3)
POTASSIUM SERPL-SCNC: 3.9 MMOL/L — SIGNIFICANT CHANGE UP (ref 3.5–5.3)
PROT SERPL-MCNC: 7.3 G/DL — SIGNIFICANT CHANGE UP (ref 6–8.3)
RBC # BLD: 4.39 M/UL — SIGNIFICANT CHANGE UP (ref 4.2–5.8)
RBC # FLD: 13.6 % — SIGNIFICANT CHANGE UP (ref 10.3–14.5)
SODIUM SERPL-SCNC: 139 MMOL/L — SIGNIFICANT CHANGE UP (ref 135–145)
SPECIMEN SOURCE: SIGNIFICANT CHANGE UP
WBC # BLD: 6.23 K/UL — SIGNIFICANT CHANGE UP (ref 3.8–10.5)
WBC # FLD AUTO: 6.23 K/UL — SIGNIFICANT CHANGE UP (ref 3.8–10.5)

## 2020-04-22 RX ORDER — MULTIVIT WITH MIN/MFOLATE/K2 340-15/3 G
2 POWDER (GRAM) ORAL ONCE
Refills: 0 | Status: COMPLETED | OUTPATIENT
Start: 2020-04-22 | End: 2020-04-22

## 2020-04-22 RX ORDER — SODIUM CHLORIDE 9 MG/ML
1000 INJECTION, SOLUTION INTRAVENOUS
Refills: 0 | Status: DISCONTINUED | OUTPATIENT
Start: 2020-04-22 | End: 2020-04-23

## 2020-04-22 RX ORDER — POLYETHYLENE GLYCOL 3350 17 G/17G
17 POWDER, FOR SOLUTION ORAL
Qty: 510 | Refills: 0
Start: 2020-04-22 | End: 2020-05-21

## 2020-04-22 RX ADMIN — SODIUM CHLORIDE 100 MILLILITER(S): 9 INJECTION, SOLUTION INTRAVENOUS at 17:58

## 2020-04-22 RX ADMIN — ONDANSETRON 4 MILLIGRAM(S): 8 TABLET, FILM COATED ORAL at 20:09

## 2020-04-22 RX ADMIN — DEXTROSE MONOHYDRATE, SODIUM CHLORIDE, AND POTASSIUM CHLORIDE 100 MILLILITER(S): 50; .745; 4.5 INJECTION, SOLUTION INTRAVENOUS at 09:54

## 2020-04-22 RX ADMIN — MORPHINE SULFATE 4 MILLIGRAM(S): 50 CAPSULE, EXTENDED RELEASE ORAL at 20:09

## 2020-04-22 RX ADMIN — MORPHINE SULFATE 4 MILLIGRAM(S): 50 CAPSULE, EXTENDED RELEASE ORAL at 12:05

## 2020-04-22 RX ADMIN — MORPHINE SULFATE 4 MILLIGRAM(S): 50 CAPSULE, EXTENDED RELEASE ORAL at 06:09

## 2020-04-22 RX ADMIN — HEPARIN SODIUM 5000 UNIT(S): 5000 INJECTION INTRAVENOUS; SUBCUTANEOUS at 13:02

## 2020-04-22 RX ADMIN — Medication 2 BOTTLE: at 17:58

## 2020-04-22 RX ADMIN — HEPARIN SODIUM 5000 UNIT(S): 5000 INJECTION INTRAVENOUS; SUBCUTANEOUS at 21:22

## 2020-04-22 NOTE — DISCHARGE NOTE PROVIDER - CARE PROVIDERS DIRECT ADDRESSES
[email or web address]

## 2020-04-22 NOTE — PROGRESS NOTE ADULT - ASSESSMENT
Colon mass   Plan for colonoscopy tomorrow (approved by Dr. Williamson)  Magnesium citrate 300 ml X 2   NPO post midnght   Advanced care planning was discussed with patient and family.  Advanced care planning forms were reviewed and discussed.  Risks, benefits and alternatives of gastroenterologic procedures were discussed in detail and all questions were answered.

## 2020-04-22 NOTE — PROGRESS NOTE ADULT - PROBLEM SELECTOR PLAN 1
p/w abdominal pain.   CT 4/21 shows partial large bowel obstruction   Last BM yesterday.   on IVF  f/u by GI dr. Ortiz  f/u Surgery dr. Angela  NPO status  f/u Colonoscopy. p/w abdominal pain.   CT 4/21 shows partial large bowel obstruction   Last BM yesterday.   on IVF  f/u by GI dr. Ortiz  f/u Surgery dr. Angela  NPO changed to clear liquid  f/u Colonoscopy-not today.  d/w GI for schedule

## 2020-04-22 NOTE — PROGRESS NOTE ADULT - SUBJECTIVE AND OBJECTIVE BOX
HPI- A 53 yo m with no sig.  PMHx, presents with  abdominal pain x 3 days . Pain is described as periumbilical ,non specific and sharp . Denies nausea, and  vomiting. Also denies fever, chills, SOB, cough, sick contacts , recent travels , chest pain or any other problems . Last BM was yesterday AM .No diarrhea or constipation Patient reports having an out-patient CT abdomen with IV contrasttoday which reportedly showed a tumor. Patient states he was told to come to ER if he had severe pain so he came to ED tonight. Patient also reports that several years ago he had an episode of hematuria and had a procedure (unsure what procedure), and then his hematuria resolved. His doctor's phone number is [phone].   s/p abdominal CT resulted partial large bowel obstruction. Pt was admitted for evaluation for LBO and COVID 19 infection. Last BM yesterday. Evaluated by GI and surgery. Recommended colonoscopy. NPO changed to clear liquid diet per.     INTERVAL HPI/OVERNIGHT EVENTS: no new complaints    MEDICATIONS  (STANDING):  dextrose 5% + sodium chloride 0.45% with potassium chloride 20 mEq/L 1000 milliLiter(s) (100 mL/Hr) IV Continuous <Continuous>  heparin   Injectable 5000 Unit(s) SubCutaneous every 8 hours  lactated ringers. 1000 milliLiter(s) (999 mL/Hr) IV Continuous <Continuous>  sodium chloride 0.9%. 1000 milliLiter(s) (50 mL/Hr) IV Continuous <Continuous>    MEDICATIONS  (PRN):  morphine  - Injectable 4 milliGRAM(s) IV Push every 6 hours PRN Severe Pain (7 - 10)  ondansetron Injectable 4 milliGRAM(s) IV Push every 6 hours PRN Nausea      __________________________________________________  REVIEW OF SYSTEMS:    CONSTITUTIONAL: No fever,   EYES: no acute visual disturbances  NECK: No pain or stiffness  RESPIRATORY: No cough; No shortness of breath  CARDIOVASCULAR: No chest pain, no palpitations  GASTROINTESTINAL: lower abdominal pain. No nausea or vomiting; No diarrhea   NEUROLOGICAL: No headache or numbness, no tremors  MUSCULOSKELETAL: No joint pain, no muscle pain  GENITOURINARY: no dysuria, no frequency, no hesitancy  PSYCHIATRY: no depression , no anxiety  ALL OTHER  ROS negative        Vital Signs Last 24 Hrs  T(C): 36.7 (2020 05:15), Max: 36.8 (2020 21:20)  T(F): 98 (2020 05:15), Max: 98.3 (2020 21:20)  HR: 74 (2020 05:15) (66 - 74)  BP: 129/77 (2020 05:15) (114/74 - 129/77)  BP(mean): --  RR: 17 (2020 05:15) (16 - 17)  SpO2: 98% (2020 05:15) (98% - 99%)    ________________________________________________  PHYSICAL EXAM:  GENERAL: NAD. Conversant. well nourished  HEENT: Normocephalic;  conjunctivae and sclerae clear; moist mucous membranes;   NECK : supple  CHEST/LUNG: Clear to auscultation bilaterally with good air entry Eupneic on room air  HEART: S1 S2  regular; no murmurs, gallops or rubs  ABDOMEN: Soft,  mildly distended, mild tenderness on RLQ.  Bowel sounds present  EXTREMITIES: no cyanosis; no edema; no calf tenderness  SKIN: warm and dry; no rash  NERVOUS SYSTEM:  Awake and alert; Oriented  to place, person and time ; no new deficits    _________________________________________________  LABS:                        13.7   8.06  )-----------( 202      ( 2020 21:19 )             40.6     04-20    139  |  105  |  8   ----------------------------<  105<H>  3.7   |  30  |  0.84    Ca    8.7      2020 21:19    TPro  7.8  /  Alb  3.1<L>  /  TBili  0.9  /  DBili  x   /  AST  102<H>  /  ALT  178<H>  /  AlkPhos  125<H>  -    PT/INR - ( 2020 05:15 )   PT: 12.4 sec;   INR: 1.09 ratio         PTT - ( 2020 05:15 )  PTT:28.4 sec  Urinalysis Basic - ( 2020 21:19 )    Color: Yellow / Appearance: Clear / S.010 / pH: x  Gluc: x / Ketone: Negative  / Bili: Negative / Urobili: Negative   Blood: x / Protein: Negative / Nitrite: Negative   Leuk Esterase: Negative / RBC: x / WBC x   Sq Epi: x / Non Sq Epi: x / Bacteria: x      CAPILLARY BLOOD GLUCOSE            RADIOLOGY & ADDITIONAL TESTS:    Imaging  Reviewed:  YES  < from: CT Abdomen and Pelvis w/ Oral Cont and w/ IV Cont (20 @ 01:26) >    EXAM:  CT ABDOMEN AND PELVIS OC IC                            PROCEDURE DATE:  2020          INTERPRETATION:  Abdominal/Pelvic CT    2020 1:51 AM    Indication: Diffuse abdominal pain, worst in the left lower quadrant    Technique: Axialimages were obtained following oral and IV contrast from the lung bases through pubic symphysis.  95 cc of Omnipaque 350 was administered intravenously without complication and 5 cc was discarded.  Reformatted coronal and sagittal images are submitted.    Comparison: None    FINDINGS:    LUNG BASES:  There are patchy groundglass and airspace opacities at the lung bases which are nonspecific.  PERITONEUM:  There is no free air or focal collection.  No free fluid.  LIVER: 8 mm hypodense lesion in the right lobe is indeterminate.  SPLEEN: Normal.  GALLBLADDER: Vicarious excretion of contrast.  BILIARY TREE: Unremarkable.  PANCREAS: Normal.  ADRENAL GLANDS: Normal.  KIDNEYS: Normal.  BOWEL: The stomach is incompletely distended.  Oral contrast isseen as distally as splenic flexure.  There is no small bowel obstruction. The appendix is unremarkable. Marked dilatation and wall thickening of the sigmoid colon with circumferential mass in the mid sigmoid colon concerning for colonic neoplasm.Thececum measures up to 4.6 cm in caliber. The left colon measures up to 4.8 cm. The sigmoid colon measures up to 5 cm proximal to the transition point.  There are no large pericolonic lymph nodes.    URINARY BLADDER: Incompletely distended.  PELVIC ORGANS: The prostate gland is not enlarged.    There is no significant adenopathy.  VASCULATURE: Unremarkable.  RETROPERITONEUM:  There is no mass.  BONES: Unremarkable.  ABDOMINAL WALL: Unremarkable.    IMPRESSION:    Partial large bowel obstruction related to sigmoid mass concerning for colonic malignancy. No distant adenopathy. Indeterminate subcentimeter hypodense lesions in the liver. The colon measures up to 5 cm proximal to the transition point. No free air or focal collection.  Nonspecific groundglass and airspace opacities at the lung bases.                JUVENCIO IVEY M.D, ATTENDING RADIOLOGIST  This document has been electronically signed. 2020  2:03AM                < end of copied text >    < from: Xray Chest 1 View- PORTABLE-Urgent (20 @ 08:34) >    EXAM:  XR CHEST PORTABLE URGENT 1V                            PROCEDURE DATE:  2020          INTERPRETATION:  History: COVID. Large bowel obstruction.    AP view of the chest was obtained.    Comparison: None.     Findings:     There is mild airspace opacity at the lung bases bilaterally. Heart size within normal limits. Ingested oral contrast is seen within the large bowel.    Impression: Mild airspace opacity at the lung bases bilaterally.                 TEAGAN LONDONO M.D., ATTENDING RADIOLOGIST  This document has been electronically signed. 2020  8:35AM                < end of copied text >      Consultant(s) Notes Reviewed:   YES  GI. surgery    Plan of care was discussed with patient and  all questions and concerns were addressed

## 2020-04-22 NOTE — DISCHARGE NOTE PROVIDER - NSDCFUADDAPPT_GEN_ALL_CORE_FT
- Follow up with Dr. Ortiz within 1-2 weeks of discharge  - Follow up with your primary care physician  - Follow up with Dr. Chang if needed (surgeon) - Follow up with your primary care physician  - Follow up with Dr. Chang if needed (surgeon)

## 2020-04-22 NOTE — DISCHARGE NOTE PROVIDER - NSDCCPCAREPLAN_GEN_ALL_CORE_FT
PRINCIPAL DISCHARGE DIAGNOSIS  Diagnosis: Large bowel obstruction  Assessment and Plan of Treatment: - Resolved, continue having liquid diet for few days and advance to soft diet as tolerated with full bowel function. Stool softener over the counter along with daily Miralax (can purchase at the pharmacy over the counter). Follow up with your primary care phyisician and gastroenterologist on discharge. If continued nausea and vomiting associated with abdominal pain, prolonged constipation with or without bloody stool, please seek medical care immediately.      SECONDARY DISCHARGE DIAGNOSES  Diagnosis: Colonic mass  Assessment and Plan of Treatment: - Sigmoid colon mass at the site of obstruction, will need a colonoscopy and biopsy as an outpatient. Follow up with Dr. Ortiz or a gastroenterologist of choice on discharge. PRINCIPAL DISCHARGE DIAGNOSIS  Diagnosis: Large bowel obstruction  Assessment and Plan of Treatment: - Resolved, continue soft diet as tolerated. Stool softener over the counter along with daily Miralax (can purchase at the pharmacy over the counter). Follow up with your primary care phyisician on discharge. If continued nausea and vomiting associated with abdominal pain, prolonged constipation with or without bloody stool, please seek medical care immediately.      SECONDARY DISCHARGE DIAGNOSES  Diagnosis: COVID-19  Assessment and Plan of Treatment: CORONAVIRUS INSTRUCTIONS:   Based on your current clinical status and stability, it has been determined that you no longer need hospitalization and can recover while remaining in self-quarantine at home. You should follow the prevention steps below until a healthcare provider or local or state health department says you can return to your normal activities.   1. You should restrict activities outside your home, except for getting medical care.   2. Do not go to work, school, or public areas.   3. Avoid using public transportation, ride-sharing, or taxis.   4. Separate yourself from other people and animals in your home as much as possible.  When you are around other people (e.g., sharing a room or vehicle) you should wear a facemask.  5. Wash your hands often with soap and water for at least 20 seconds, especially after blowing your nose, coughing, or sneezing; going to the bathroom; and before eating or preparing food.  6. Cover your mouth and nose with a tissue when you cough or sneeze. Throw used tissues in a lined trash can. Immediately wash your hands with soap and water for at least 20 seconds  7. High touch surfaces include counters, tabletops, doorknobs, bathroom fixtures, toilets, phones, keyboards, tablets, and bedside tables.  8. Avoid sharing dishes, drinking glasses, cups, eating utensils, towels, or bedding with other people or pets in your home. After using these items, they should be washed thoroughly with soap and water.  You are strongly advised to seek prompt medical attention if your illness worsens or you develop new symptoms like fever or difficulty breathing.   Please call  [phone] to speak with your discharging physician if you have any questions.

## 2020-04-22 NOTE — PROGRESS NOTE ADULT - SUBJECTIVE AND OBJECTIVE BOX
Condition discussed with patient and Dr. Lorenzo, options risks and benefits explained.  Plan: colonoscopy/ flexible sigmoidoscopy in AM

## 2020-04-22 NOTE — DISCHARGE NOTE PROVIDER - HOSPITAL COURSE
54 yoM with no significant PMH p/w abdominal pain for 3 days, periumbilical nonradiating and not associated with nausea or vomiting. Also denies fever, chills, SOB, cough, sick contacts, recent travels, chest pain or any other problems. No diarrhea or constipation. Patient reported having an out-patient CT abdomen with IV contrast today which reportedly showed a colon tumor. Repeated CT revealed partial large bowel obstruction related to sigmoid mass. Found to be COVID19+. Patient was admitted for observation and serial abdominal exams. Patient endorsed positive bowel function, therefore was advanced to clear liquid diet. Patient tolerated the diet well, no pain or any evidence of obstruction. 54 yoM with no significant PMH p/w abdominal pain for 3 days, periumbilical nonradiating and not associated with nausea or vomiting. Also denies fever, chills, SOB, cough, sick contacts, recent travels, chest pain or any other problems. No diarrhea or constipation. Patient reported having an out-patient CT abdomen with IV contrast today which reportedly showed a colon tumor. Repeated CT revealed partial large bowel obstruction related to sigmoid mass. Found to be COVID19+. Patient was admitted for observation and serial abdominal exams. Patient endorsed positive bowel function, therefore was advanced to clear liquid diet. Patient tolerated the diet well, no pain or any evidence of obstruction. GI was consulted and recommended colonoscopy for further investigation and biopsy as an outpatient. Patient deemed safe to discharge home on liquid diet and advance to soft diet as tolerated at home. Patient to follow up with a gastroenterologist and primary care physician on discharge. 54 yoM with no significant PMH p/w abdominal pain for 3 days, periumbilical nonradiating and not associated with nausea or vomiting. Also denies fever, chills, SOB, cough, sick contacts, recent travels, chest pain or any other problems. No diarrhea or constipation. Patient reported having an out-patient CT abdomen with IV contrast today which reportedly showed a colon tumor. Repeated CT revealed partial large bowel obstruction related to sigmoid mass. Found to be COVID19+. Patient was admitted for observation and serial abdominal exams. Patient endorsed positive bowel function, therefore was advanced to clear liquid diet. Patient tolerated the diet well, no pain or any evidence of obstruction. GI performed colonoscopy 4/23, did not see a mass. Pt tolerated soft diet and discharged.

## 2020-04-22 NOTE — PROGRESS NOTE ADULT - PROBLEM SELECTOR PLAN 2
+ COVID 19 PCR (4/21).  PO2 sat 98% on Room air. Tmax 98F  D-Dimer 592-c/w heparin Sc  Ferritin 527  not on antiviral agent.   monitor respiratory status

## 2020-04-22 NOTE — DISCHARGE NOTE PROVIDER - CARE PROVIDER_API CALL
Amari Chang)  Surgery  [address] [facility] [place] [address]  Phone: [phone]  Fax: [phone]  Follow Up Time:     Rafa Yee)  Gastroenterology; Internal Medicine  [address] [place] [address]  Phone: [phone]  Fax: [phone]  Follow Up Time: 1 week

## 2020-04-22 NOTE — PROGRESS NOTE ADULT - SUBJECTIVE AND OBJECTIVE BOX
Summary:   54y  Male      Subjective:   No events     Objective:    MEDICATIONS  (STANDING):  dextrose 5% + sodium chloride 0.45% with potassium chloride 20 mEq/L 1000 milliLiter(s) (100 mL/Hr) IV Continuous <Continuous>  heparin   Injectable 5000 Unit(s) SubCutaneous every 8 hours  lactated ringers. 1000 milliLiter(s) (999 mL/Hr) IV Continuous <Continuous>  lactated ringers. 1000 milliLiter(s) (100 mL/Hr) IV Continuous <Continuous>  magnesium citrate Oral Solution 2 Bottle Oral once  sodium chloride 0.9%. 1000 milliLiter(s) (50 mL/Hr) IV Continuous <Continuous>    MEDICATIONS  (PRN):  morphine  - Injectable 4 milliGRAM(s) IV Push every 6 hours PRN Severe Pain (7 - 10)  ondansetron Injectable 4 milliGRAM(s) IV Push every 6 hours PRN Nausea              Vital Signs Last 24 Hrs  T(C): 36.6 (2020 12:20), Max: 36.8 (2020 21:20)  T(F): 97.8 (2020 12:20), Max: 98.3 (2020 21:20)  HR: 81 (2020 12:20) (66 - 81)  BP: 131/79 (2020 12:20) (114/74 - 131/79)  BP(mean): --  RR: 16 (2020 12:20) (16 - 17)  SpO2: 97% (2020 12:20) (97% - 99%)      General:  Well developed, well nourished, alert and active, no pallor, NAD.  HEENT:    Normal appearance of conjunctiva, ears, nose, lips, oropharynx, and oral mucosa, anicteric.  Neck:  No masses, no asymmetry.  Lymph Nodes:  No lymphadenopathy.   Cardiovascular:  RRR normal S1/S2, no murmur.  Respiratory:  CTA B/L, normal respiratory effort.   Abdominal:   soft, no masses or tenderness, normoactive BS, NT/ND, no HSM.  Extremities:   No clubbing or cyanosis, normal capillary refill, no edema.   Skin:   No rash, jaundice, lesions, eczema.   Musculoskeletal:  No joint swelling, erythema or tenderness.   Neuro: No focal deficits.   Other:       LABS:                        14.1   6.23  )-----------( 175      ( 2020 14:28 )             42.2     04-    139  |  106  |  9   ----------------------------<  134<H>  3.9   |  27  |  0.94    Ca    8.6      2020 14:28    TPro  7.3  /  Alb  3.1<L>  /  TBili  1.1  /  DBili  x   /  AST  69<H>  /  ALT  163<H>  /  AlkPhos  115  22    PT/INR - ( 2020 05:15 )   PT: 12.4 sec;   INR: 1.09 ratio         PTT - ( 2020 05:15 )  PTT:28.4 sec  Urinalysis Basic - ( 2020 21:19 )    Color: Yellow / Appearance: Clear / S.010 / pH: x  Gluc: x / Ketone: Negative  / Bili: Negative / Urobili: Negative   Blood: x / Protein: Negative / Nitrite: Negative   Leuk Esterase: Negative / RBC: x / WBC x   Sq Epi: x / Non Sq Epi: x / Bacteria: x        RADIOLOGY & ADDITIONAL TESTS:

## 2020-04-23 VITALS
DIASTOLIC BLOOD PRESSURE: 76 MMHG | RESPIRATION RATE: 16 BRPM | TEMPERATURE: 98 F | HEART RATE: 87 BPM | OXYGEN SATURATION: 98 % | SYSTOLIC BLOOD PRESSURE: 129 MMHG

## 2020-04-23 LAB
ALBUMIN SERPL ELPH-MCNC: 3.1 G/DL — LOW (ref 3.5–5)
ALP SERPL-CCNC: 115 U/L — SIGNIFICANT CHANGE UP (ref 40–120)
ALT FLD-CCNC: 140 U/L DA — HIGH (ref 10–60)
ANION GAP SERPL CALC-SCNC: 8 MMOL/L — SIGNIFICANT CHANGE UP (ref 5–17)
AST SERPL-CCNC: 48 U/L — HIGH (ref 10–40)
BASOPHILS # BLD AUTO: 0.03 K/UL — SIGNIFICANT CHANGE UP (ref 0–0.2)
BASOPHILS NFR BLD AUTO: 0.3 % — SIGNIFICANT CHANGE UP (ref 0–2)
BILIRUB SERPL-MCNC: 1.1 MG/DL — SIGNIFICANT CHANGE UP (ref 0.2–1.2)
BUN SERPL-MCNC: 8 MG/DL — SIGNIFICANT CHANGE UP (ref 7–18)
CALCIUM SERPL-MCNC: 8.7 MG/DL — SIGNIFICANT CHANGE UP (ref 8.4–10.5)
CHLORIDE SERPL-SCNC: 101 MMOL/L — SIGNIFICANT CHANGE UP (ref 96–108)
CO2 SERPL-SCNC: 29 MMOL/L — SIGNIFICANT CHANGE UP (ref 22–31)
CREAT SERPL-MCNC: 0.84 MG/DL — SIGNIFICANT CHANGE UP (ref 0.5–1.3)
EOSINOPHIL # BLD AUTO: 0.27 K/UL — SIGNIFICANT CHANGE UP (ref 0–0.5)
EOSINOPHIL NFR BLD AUTO: 3.1 % — SIGNIFICANT CHANGE UP (ref 0–6)
GLUCOSE SERPL-MCNC: 96 MG/DL — SIGNIFICANT CHANGE UP (ref 70–99)
HCT VFR BLD CALC: 42.1 % — SIGNIFICANT CHANGE UP (ref 39–50)
HGB BLD-MCNC: 14.2 G/DL — SIGNIFICANT CHANGE UP (ref 13–17)
IMM GRANULOCYTES NFR BLD AUTO: 0.3 % — SIGNIFICANT CHANGE UP (ref 0–1.5)
LYMPHOCYTES # BLD AUTO: 1.44 K/UL — SIGNIFICANT CHANGE UP (ref 1–3.3)
LYMPHOCYTES # BLD AUTO: 16.3 % — SIGNIFICANT CHANGE UP (ref 13–44)
MCHC RBC-ENTMCNC: 31.8 PG — SIGNIFICANT CHANGE UP (ref 27–34)
MCHC RBC-ENTMCNC: 33.7 GM/DL — SIGNIFICANT CHANGE UP (ref 32–36)
MCV RBC AUTO: 94.4 FL — SIGNIFICANT CHANGE UP (ref 80–100)
MONOCYTES # BLD AUTO: 0.65 K/UL — SIGNIFICANT CHANGE UP (ref 0–0.9)
MONOCYTES NFR BLD AUTO: 7.4 % — SIGNIFICANT CHANGE UP (ref 2–14)
NEUTROPHILS # BLD AUTO: 6.39 K/UL — SIGNIFICANT CHANGE UP (ref 1.8–7.4)
NEUTROPHILS NFR BLD AUTO: 72.6 % — SIGNIFICANT CHANGE UP (ref 43–77)
NRBC # BLD: 0 /100 WBCS — SIGNIFICANT CHANGE UP (ref 0–0)
PLATELET # BLD AUTO: 180 K/UL — SIGNIFICANT CHANGE UP (ref 150–400)
POTASSIUM SERPL-MCNC: 4 MMOL/L — SIGNIFICANT CHANGE UP (ref 3.5–5.3)
POTASSIUM SERPL-SCNC: 4 MMOL/L — SIGNIFICANT CHANGE UP (ref 3.5–5.3)
PROT SERPL-MCNC: 7.3 G/DL — SIGNIFICANT CHANGE UP (ref 6–8.3)
RBC # BLD: 4.46 M/UL — SIGNIFICANT CHANGE UP (ref 4.2–5.8)
RBC # FLD: 13.6 % — SIGNIFICANT CHANGE UP (ref 10.3–14.5)
SODIUM SERPL-SCNC: 138 MMOL/L — SIGNIFICANT CHANGE UP (ref 135–145)
WBC # BLD: 8.81 K/UL — SIGNIFICANT CHANGE UP (ref 3.8–10.5)
WBC # FLD AUTO: 8.81 K/UL — SIGNIFICANT CHANGE UP (ref 3.8–10.5)

## 2020-04-23 PROCEDURE — 36415 COLL VENOUS BLD VENIPUNCTURE: CPT

## 2020-04-23 PROCEDURE — 99231 SBSQ HOSP IP/OBS SF/LOW 25: CPT

## 2020-04-23 PROCEDURE — 83690 ASSAY OF LIPASE: CPT

## 2020-04-23 PROCEDURE — 85379 FIBRIN DEGRADATION QUANT: CPT

## 2020-04-23 PROCEDURE — 86900 BLOOD TYPING SEROLOGIC ABO: CPT

## 2020-04-23 PROCEDURE — 85027 COMPLETE CBC AUTOMATED: CPT

## 2020-04-23 PROCEDURE — 82378 CARCINOEMBRYONIC ANTIGEN: CPT

## 2020-04-23 PROCEDURE — 87635 SARS-COV-2 COVID-19 AMP PRB: CPT

## 2020-04-23 PROCEDURE — 71045 X-RAY EXAM CHEST 1 VIEW: CPT

## 2020-04-23 PROCEDURE — 81003 URINALYSIS AUTO W/O SCOPE: CPT

## 2020-04-23 PROCEDURE — 80053 COMPREHEN METABOLIC PANEL: CPT

## 2020-04-23 PROCEDURE — 74177 CT ABD & PELVIS W/CONTRAST: CPT

## 2020-04-23 PROCEDURE — 84145 PROCALCITONIN (PCT): CPT

## 2020-04-23 PROCEDURE — 82728 ASSAY OF FERRITIN: CPT

## 2020-04-23 PROCEDURE — 86901 BLOOD TYPING SEROLOGIC RH(D): CPT

## 2020-04-23 PROCEDURE — 85610 PROTHROMBIN TIME: CPT

## 2020-04-23 PROCEDURE — 87086 URINE CULTURE/COLONY COUNT: CPT

## 2020-04-23 PROCEDURE — 85730 THROMBOPLASTIN TIME PARTIAL: CPT

## 2020-04-23 PROCEDURE — 86850 RBC ANTIBODY SCREEN: CPT

## 2020-04-23 PROCEDURE — 99285 EMERGENCY DEPT VISIT HI MDM: CPT | Mod: 25

## 2020-04-23 PROCEDURE — 83880 ASSAY OF NATRIURETIC PEPTIDE: CPT

## 2020-04-23 PROCEDURE — 86803 HEPATITIS C AB TEST: CPT

## 2020-04-23 PROCEDURE — 86140 C-REACTIVE PROTEIN: CPT

## 2020-04-23 RX ORDER — SODIUM CHLORIDE 9 MG/ML
1000 INJECTION, SOLUTION INTRAVENOUS
Refills: 0 | Status: DISCONTINUED | OUTPATIENT
Start: 2020-04-23 | End: 2020-04-23

## 2020-04-23 RX ORDER — ACETAMINOPHEN 500 MG
1000 TABLET ORAL ONCE
Refills: 0 | Status: COMPLETED | OUTPATIENT
Start: 2020-04-23 | End: 2020-04-23

## 2020-04-23 RX ADMIN — ONDANSETRON 4 MILLIGRAM(S): 8 TABLET, FILM COATED ORAL at 03:31

## 2020-04-23 RX ADMIN — Medication 400 MILLIGRAM(S): at 01:34

## 2020-04-23 RX ADMIN — MORPHINE SULFATE 4 MILLIGRAM(S): 50 CAPSULE, EXTENDED RELEASE ORAL at 05:21

## 2020-04-23 NOTE — CHART NOTE - NSCHARTNOTEFT_GEN_A_CORE
Colonoscopy Report  Indication: Abnormal Imaging   Referring: Dr. Ross   Instrument:    Anesthesia: MAC  Consent:  Informed consent was obtained from the patient after providing any opportunity for questions  Procedure: After placing the patient in the left lateral decubitus position, the colonoscope was gently inserted into the rectum and advanced to the cecum. Color, texture, mucosa, and anatomy of the colon were carefully examined with the scope. The patient tolerated the procedure well. After completion of the exam, the patient was transferred to the recovery room.     Preparation:  Findings:   Anal Canal	Normal  Rectum	Normal  Sigmoid Colon 	Liquid stool   Descending Colon	Liquid stool   Splenic Flexure	Liquid stool   Transverse Colon	Liquid stool   Hepatic Flexure	Solid stool   Ascending Colon	   Cecum	Normal  Ileo-cecal Valve	Not seen   Ileum 	  Date and time: 11/27/2019 9:21 AM  EBL:0    Impression:  1- Scope was able to pass to hepatic flexure. 2- No overt mass however given copious amounts of stool unable to completely rule of mass. 3- No bowel obstruction       Restart diet   Discharge planning with outpatient colonoscopy with improved prep                     Procedure Start Time:    Cecum Reached Time:   Procedure End Time:     Total Withdrawal Time:                                 Attending:       Rafa Yee M.D.   Date and Time: 11/27/2019 9:21:57 AM

## 2020-04-23 NOTE — DISCHARGE NOTE NURSING/CASE MANAGEMENT/SOCIAL WORK - PATIENT PORTAL LINK FT
You can access the FollowMyHealth Patient Portal offered by [facility] by registering at the following website: [email or web address]. By joining [employer]’s FollowMyHealth portal, you will also be able to view your health information using other applications (apps) compatible with our system.

## 2020-04-23 NOTE — PROGRESS NOTE ADULT - SUBJECTIVE AND OBJECTIVE BOX
Surgery    Subjective:  Pt scheduled for colonoscopy today. Took mag citrate without BMs.  NPO.    T(C): 36.4 (04-23-20 @ 12:52), Max: 36.8 (04-22-20 @ 20:13)  HR: 88 (04-23-20 @ 12:52) (80 - 88)  BP: 126/79 (04-23-20 @ 12:52) (126/79 - 132/85)  RR: 16 (04-23-20 @ 12:52) (16 - 16)  SpO2: 97% (04-23-20 @ 12:52) (96% - 97%)    Physical:  Gen: NAD.   Abd: Soft distended, NT

## 2020-04-27 PROBLEM — Z00.00 ENCOUNTER FOR PREVENTIVE HEALTH EXAMINATION: Status: ACTIVE | Noted: 2020-04-27
